# Patient Record
Sex: FEMALE | Race: ASIAN | NOT HISPANIC OR LATINO
[De-identification: names, ages, dates, MRNs, and addresses within clinical notes are randomized per-mention and may not be internally consistent; named-entity substitution may affect disease eponyms.]

---

## 2017-06-29 ENCOUNTER — APPOINTMENT (OUTPATIENT)
Dept: INTERNAL MEDICINE | Facility: CLINIC | Age: 47
End: 2017-06-29

## 2017-06-29 VITALS
SYSTOLIC BLOOD PRESSURE: 102 MMHG | BODY MASS INDEX: 20.43 KG/M2 | DIASTOLIC BLOOD PRESSURE: 74 MMHG | HEIGHT: 62 IN | WEIGHT: 111 LBS

## 2017-08-03 ENCOUNTER — OUTPATIENT (OUTPATIENT)
Dept: OUTPATIENT SERVICES | Facility: HOSPITAL | Age: 47
LOS: 1 days | End: 2017-08-03
Payer: COMMERCIAL

## 2017-08-03 ENCOUNTER — APPOINTMENT (OUTPATIENT)
Dept: RADIOLOGY | Facility: CLINIC | Age: 47
End: 2017-08-03
Payer: COMMERCIAL

## 2017-08-03 DIAGNOSIS — R91.1 SOLITARY PULMONARY NODULE: ICD-10-CM

## 2017-08-03 PROCEDURE — 71046 X-RAY EXAM CHEST 2 VIEWS: CPT

## 2017-08-03 PROCEDURE — 71020: CPT | Mod: 26

## 2017-09-05 ENCOUNTER — LABORATORY RESULT (OUTPATIENT)
Age: 47
End: 2017-09-05

## 2017-09-05 ENCOUNTER — APPOINTMENT (OUTPATIENT)
Dept: INTERNAL MEDICINE | Facility: CLINIC | Age: 47
End: 2017-09-05
Payer: COMMERCIAL

## 2017-09-05 PROCEDURE — 36415 COLL VENOUS BLD VENIPUNCTURE: CPT

## 2017-09-06 LAB
ALBUMIN SERPL ELPH-MCNC: 4.2 G/DL
ALP BLD-CCNC: 38 U/L
ALT SERPL-CCNC: 10 U/L
ANION GAP SERPL CALC-SCNC: 13 MMOL/L
AST SERPL-CCNC: 19 U/L
BASOPHILS # BLD AUTO: 0.02 K/UL
BASOPHILS NFR BLD AUTO: 0.4 %
BILIRUB SERPL-MCNC: 0.4 MG/DL
BUN SERPL-MCNC: 14 MG/DL
CALCIUM SERPL-MCNC: 8.8 MG/DL
CHLORIDE SERPL-SCNC: 106 MMOL/L
CHOLEST SERPL-MCNC: 176 MG/DL
CHOLEST/HDLC SERPL: 2.1 RATIO
CO2 SERPL-SCNC: 23 MMOL/L
CREAT SERPL-MCNC: 0.79 MG/DL
EOSINOPHIL # BLD AUTO: 0.1 K/UL
EOSINOPHIL NFR BLD AUTO: 1.8 %
GLUCOSE SERPL-MCNC: 86 MG/DL
HCT VFR BLD CALC: 37.7 %
HDLC SERPL-MCNC: 83 MG/DL
HGB BLD-MCNC: 12.2 G/DL
IMM GRANULOCYTES NFR BLD AUTO: 0.2 %
LDLC SERPL CALC-MCNC: 83 MG/DL
LDLC SERPL DIRECT ASSAY-MCNC: 91 MG/DL
LYMPHOCYTES # BLD AUTO: 2.33 K/UL
LYMPHOCYTES NFR BLD AUTO: 42.3 %
MAN DIFF?: NORMAL
MCHC RBC-ENTMCNC: 30.1 PG
MCHC RBC-ENTMCNC: 32.4 GM/DL
MCV RBC AUTO: 93.1 FL
MONOCYTES # BLD AUTO: 0.25 K/UL
MONOCYTES NFR BLD AUTO: 4.5 %
NEUTROPHILS # BLD AUTO: 2.8 K/UL
NEUTROPHILS NFR BLD AUTO: 50.8 %
PLATELET # BLD AUTO: 214 K/UL
POTASSIUM SERPL-SCNC: 4.3 MMOL/L
PROT SERPL-MCNC: 7.5 G/DL
RBC # BLD: 4.05 M/UL
RBC # FLD: 13.4 %
SAVE SPECIMEN: NORMAL
SODIUM SERPL-SCNC: 142 MMOL/L
T3 SERPL-MCNC: 92 NG/DL
T3RU NFR SERPL: 1 INDEX
T4 FREE SERPL-MCNC: 1.3 NG/DL
TRIGL SERPL-MCNC: 49 MG/DL
TSH SERPL-ACNC: 1.81 UIU/ML
WBC # FLD AUTO: 5.51 K/UL

## 2017-09-07 ENCOUNTER — APPOINTMENT (OUTPATIENT)
Dept: INTERNAL MEDICINE | Facility: CLINIC | Age: 47
End: 2017-09-07
Payer: COMMERCIAL

## 2017-09-07 VITALS
BODY MASS INDEX: 20.98 KG/M2 | HEIGHT: 62 IN | SYSTOLIC BLOOD PRESSURE: 98 MMHG | DIASTOLIC BLOOD PRESSURE: 72 MMHG | WEIGHT: 114 LBS

## 2017-09-07 PROCEDURE — 94010 BREATHING CAPACITY TEST: CPT

## 2017-09-07 PROCEDURE — 99396 PREV VISIT EST AGE 40-64: CPT | Mod: 25

## 2017-09-07 PROCEDURE — 93000 ELECTROCARDIOGRAM COMPLETE: CPT

## 2017-11-27 ENCOUNTER — APPOINTMENT (OUTPATIENT)
Dept: INTERNAL MEDICINE | Facility: CLINIC | Age: 47
End: 2017-11-27

## 2017-12-04 ENCOUNTER — RESULT REVIEW (OUTPATIENT)
Age: 47
End: 2017-12-04

## 2018-01-10 ENCOUNTER — APPOINTMENT (OUTPATIENT)
Dept: ULTRASOUND IMAGING | Facility: IMAGING CENTER | Age: 48
End: 2018-01-10
Payer: COMMERCIAL

## 2018-01-10 ENCOUNTER — OUTPATIENT (OUTPATIENT)
Dept: OUTPATIENT SERVICES | Facility: HOSPITAL | Age: 48
LOS: 1 days | End: 2018-01-10
Payer: COMMERCIAL

## 2018-01-10 ENCOUNTER — APPOINTMENT (OUTPATIENT)
Dept: MAMMOGRAPHY | Facility: IMAGING CENTER | Age: 48
End: 2018-01-10

## 2018-01-10 DIAGNOSIS — Z00.00 ENCOUNTER FOR GENERAL ADULT MEDICAL EXAMINATION WITHOUT ABNORMAL FINDINGS: ICD-10-CM

## 2018-01-10 PROCEDURE — 76641 ULTRASOUND BREAST COMPLETE: CPT

## 2018-01-10 PROCEDURE — 77063 BREAST TOMOSYNTHESIS BI: CPT

## 2018-01-10 PROCEDURE — 77067 SCR MAMMO BI INCL CAD: CPT

## 2018-01-10 PROCEDURE — 76641 ULTRASOUND BREAST COMPLETE: CPT | Mod: 26,50

## 2018-01-10 PROCEDURE — 77067 SCR MAMMO BI INCL CAD: CPT | Mod: 26

## 2018-01-10 PROCEDURE — 77063 BREAST TOMOSYNTHESIS BI: CPT | Mod: 26

## 2018-07-12 ENCOUNTER — OUTPATIENT (OUTPATIENT)
Dept: OUTPATIENT SERVICES | Facility: HOSPITAL | Age: 48
LOS: 1 days | End: 2018-07-12
Payer: COMMERCIAL

## 2018-07-12 ENCOUNTER — APPOINTMENT (OUTPATIENT)
Dept: ULTRASOUND IMAGING | Facility: IMAGING CENTER | Age: 48
End: 2018-07-12
Payer: COMMERCIAL

## 2018-07-12 DIAGNOSIS — Z00.8 ENCOUNTER FOR OTHER GENERAL EXAMINATION: ICD-10-CM

## 2018-07-12 PROCEDURE — 76642 ULTRASOUND BREAST LIMITED: CPT

## 2018-07-12 PROCEDURE — 76642 ULTRASOUND BREAST LIMITED: CPT | Mod: 26,50

## 2019-01-02 ENCOUNTER — LABORATORY RESULT (OUTPATIENT)
Age: 49
End: 2019-01-02

## 2019-01-02 ENCOUNTER — APPOINTMENT (OUTPATIENT)
Dept: INTERNAL MEDICINE | Facility: CLINIC | Age: 49
End: 2019-01-02
Payer: COMMERCIAL

## 2019-01-02 PROCEDURE — 36415 COLL VENOUS BLD VENIPUNCTURE: CPT

## 2019-01-03 LAB
ALBUMIN SERPL ELPH-MCNC: 4.7 G/DL
ALP BLD-CCNC: 41 U/L
ALT SERPL-CCNC: 17 U/L
ANION GAP SERPL CALC-SCNC: 10 MMOL/L
AST SERPL-CCNC: 21 U/L
BASOPHILS # BLD AUTO: 0.02 K/UL
BASOPHILS NFR BLD AUTO: 0.5 %
BILIRUB SERPL-MCNC: 1.1 MG/DL
BUN SERPL-MCNC: 11 MG/DL
CALCIUM SERPL-MCNC: 9.4 MG/DL
CHLORIDE SERPL-SCNC: 103 MMOL/L
CHOLEST SERPL-MCNC: 183 MG/DL
CHOLEST/HDLC SERPL: 2.1 RATIO
CO2 SERPL-SCNC: 27 MMOL/L
CREAT SERPL-MCNC: 0.78 MG/DL
EOSINOPHIL # BLD AUTO: 0.07 K/UL
EOSINOPHIL NFR BLD AUTO: 1.8 %
GLUCOSE SERPL-MCNC: 83 MG/DL
HCT VFR BLD CALC: 42.5 %
HDLC SERPL-MCNC: 87 MG/DL
HGB BLD-MCNC: 13.4 G/DL
IMM GRANULOCYTES NFR BLD AUTO: 0 %
LDLC SERPL CALC-MCNC: 82 MG/DL
LDLC SERPL DIRECT ASSAY-MCNC: 98 MG/DL
LYMPHOCYTES # BLD AUTO: 1.7 K/UL
LYMPHOCYTES NFR BLD AUTO: 43.6 %
MAN DIFF?: NORMAL
MCHC RBC-ENTMCNC: 30.4 PG
MCHC RBC-ENTMCNC: 31.5 GM/DL
MCV RBC AUTO: 96.4 FL
MONOCYTES # BLD AUTO: 0.18 K/UL
MONOCYTES NFR BLD AUTO: 4.6 %
NEUTROPHILS # BLD AUTO: 1.93 K/UL
NEUTROPHILS NFR BLD AUTO: 49.5 %
PLATELET # BLD AUTO: 208 K/UL
POTASSIUM SERPL-SCNC: 4.1 MMOL/L
PROT SERPL-MCNC: 7.5 G/DL
RBC # BLD: 4.41 M/UL
RBC # FLD: 13.2 %
SODIUM SERPL-SCNC: 140 MMOL/L
T3 SERPL-MCNC: 100 NG/DL
T3RU NFR SERPL: 1.12 INDEX
T4 FREE SERPL-MCNC: 1.1 NG/DL
TRIGL SERPL-MCNC: 71 MG/DL
TSH SERPL-ACNC: 1.82 UIU/ML
WBC # FLD AUTO: 3.9 K/UL

## 2019-01-07 LAB — SAVE SPECIMEN: NORMAL

## 2019-01-08 ENCOUNTER — APPOINTMENT (OUTPATIENT)
Dept: INTERNAL MEDICINE | Facility: CLINIC | Age: 49
End: 2019-01-08
Payer: COMMERCIAL

## 2019-01-08 VITALS
HEIGHT: 62 IN | SYSTOLIC BLOOD PRESSURE: 110 MMHG | BODY MASS INDEX: 21.16 KG/M2 | DIASTOLIC BLOOD PRESSURE: 70 MMHG | WEIGHT: 115 LBS

## 2019-01-08 DIAGNOSIS — R10.13 EPIGASTRIC PAIN: ICD-10-CM

## 2019-01-08 DIAGNOSIS — K59.00 CONSTIPATION, UNSPECIFIED: ICD-10-CM

## 2019-01-08 DIAGNOSIS — R53.83 OTHER FATIGUE: ICD-10-CM

## 2019-01-08 PROCEDURE — 93000 ELECTROCARDIOGRAM COMPLETE: CPT

## 2019-01-08 PROCEDURE — 99396 PREV VISIT EST AGE 40-64: CPT | Mod: 25

## 2019-01-08 PROCEDURE — 94010 BREATHING CAPACITY TEST: CPT

## 2019-01-08 NOTE — HISTORY OF PRESENT ILLNESS
[FreeTextEntry1] : CPE [de-identified] : 3 months of intermittent dyspepsia,  especially noticeable after eating spicy food. Occasional constipation. No melena. No rectal bleeding. No abdominal pain.\par Occasional fatigue

## 2019-01-08 NOTE — ASSESSMENT
[FreeTextEntry1] : Dyspepsia and FH stomach cancer-endoscopy\par Constipation and FH COLON cancer-diagnostic colonoscopy\par RX shingrix

## 2019-01-08 NOTE — PHYSICAL EXAM
[No Acute Distress] : no acute distress [Well Nourished] : well nourished [Well Developed] : well developed [Well-Appearing] : well-appearing [Normal Outer Ear/Nose] : the outer ears and nose were normal in appearance [Supple] : supple [No Respiratory Distress] : no respiratory distress  [Clear to Auscultation] : lungs were clear to auscultation bilaterally [Normal Rate] : normal rate  [Regular Rhythm] : with a regular rhythm [No Edema] : there was no peripheral edema [Normal Appearance] : normal in appearance [No Masses] : no palpable masses [No Nipple Discharge] : no nipple discharge [Soft] : abdomen soft [Non Tender] : non-tender [Normal Posterior Cervical Nodes] : no posterior cervical lymphadenopathy [Normal Anterior Cervical Nodes] : no anterior cervical lymphadenopathy [No CVA Tenderness] : no CVA  tenderness [No Spinal Tenderness] : no spinal tenderness [Normal Gait] : normal gait [Normal Affect] : the affect was normal [Normal Mood] : the mood was normal

## 2019-02-12 ENCOUNTER — APPOINTMENT (OUTPATIENT)
Dept: GASTROENTEROLOGY | Facility: CLINIC | Age: 49
End: 2019-02-12
Payer: COMMERCIAL

## 2019-02-12 ENCOUNTER — LABORATORY RESULT (OUTPATIENT)
Age: 49
End: 2019-02-12

## 2019-02-12 ENCOUNTER — RX RENEWAL (OUTPATIENT)
Age: 49
End: 2019-02-12

## 2019-02-12 LAB
HCG UR QL: NEGATIVE
QUALITY CONTROL: YES

## 2019-02-12 PROCEDURE — 45380 COLONOSCOPY AND BIOPSY: CPT | Mod: 59

## 2019-02-12 PROCEDURE — 81025 URINE PREGNANCY TEST: CPT

## 2019-02-12 PROCEDURE — 45385 COLONOSCOPY W/LESION REMOVAL: CPT

## 2019-02-12 PROCEDURE — 43239 EGD BIOPSY SINGLE/MULTIPLE: CPT

## 2019-04-04 ENCOUNTER — OUTPATIENT (OUTPATIENT)
Dept: OUTPATIENT SERVICES | Facility: HOSPITAL | Age: 49
LOS: 1 days | End: 2019-04-04
Payer: COMMERCIAL

## 2019-04-04 ENCOUNTER — APPOINTMENT (OUTPATIENT)
Dept: ULTRASOUND IMAGING | Facility: IMAGING CENTER | Age: 49
End: 2019-04-04
Payer: COMMERCIAL

## 2019-04-04 ENCOUNTER — APPOINTMENT (OUTPATIENT)
Dept: MAMMOGRAPHY | Facility: IMAGING CENTER | Age: 49
End: 2019-04-04
Payer: COMMERCIAL

## 2019-04-04 DIAGNOSIS — Z00.00 ENCOUNTER FOR GENERAL ADULT MEDICAL EXAMINATION WITHOUT ABNORMAL FINDINGS: ICD-10-CM

## 2019-04-04 DIAGNOSIS — R92.2 INCONCLUSIVE MAMMOGRAM: ICD-10-CM

## 2019-04-04 PROCEDURE — 77066 DX MAMMO INCL CAD BI: CPT | Mod: 26

## 2019-04-04 PROCEDURE — G0279: CPT

## 2019-04-04 PROCEDURE — 76641 ULTRASOUND BREAST COMPLETE: CPT | Mod: 26,50

## 2019-04-04 PROCEDURE — G0279: CPT | Mod: 26

## 2019-04-04 PROCEDURE — 77066 DX MAMMO INCL CAD BI: CPT

## 2019-04-04 PROCEDURE — 76641 ULTRASOUND BREAST COMPLETE: CPT

## 2019-06-12 ENCOUNTER — APPOINTMENT (OUTPATIENT)
Dept: INTERNAL MEDICINE | Facility: CLINIC | Age: 49
End: 2019-06-12

## 2020-06-12 ENCOUNTER — APPOINTMENT (OUTPATIENT)
Dept: INTERNAL MEDICINE | Facility: CLINIC | Age: 50
End: 2020-06-12
Payer: COMMERCIAL

## 2020-06-12 VITALS
DIASTOLIC BLOOD PRESSURE: 70 MMHG | SYSTOLIC BLOOD PRESSURE: 110 MMHG | HEART RATE: 73 BPM | OXYGEN SATURATION: 98 % | HEIGHT: 62 IN | WEIGHT: 115 LBS | TEMPERATURE: 99.3 F | BODY MASS INDEX: 21.16 KG/M2

## 2020-06-12 DIAGNOSIS — Z11.59 ENCOUNTER FOR SCREENING FOR OTHER VIRAL DISEASES: ICD-10-CM

## 2020-06-12 PROCEDURE — 99386 PREV VISIT NEW AGE 40-64: CPT | Mod: 25

## 2020-06-12 PROCEDURE — 93000 ELECTROCARDIOGRAM COMPLETE: CPT

## 2020-06-12 PROCEDURE — 36415 COLL VENOUS BLD VENIPUNCTURE: CPT

## 2020-06-12 RX ORDER — OMEPRAZOLE 40 MG/1
40 CAPSULE, DELAYED RELEASE ORAL
Qty: 30 | Refills: 2 | Status: DISCONTINUED | COMMUNITY
Start: 2019-02-12 | End: 2020-06-12

## 2020-06-12 NOTE — PLAN
[FreeTextEntry1] : \par HCM Follow with ophthalmology, dental, GYN as recommended, MMG Rx'd with breast U/S due to dense breast, check Hep B status, covid AB ( pt was possibly exposed while working at [employer] ). colonoscopy 2019 benign\par Will recommend Shingrix vaccination and TDAP\par EKG and lab done\par Easy bruising; pt to RTC when the bruise seen w/o trigger at unusual sites

## 2020-06-12 NOTE — HISTORY OF PRESENT ILLNESS
[FreeTextEntry1] : pt is here for CPE [de-identified] : pt has been doing well without any acute illness. pt is due to MMG and breast U/S. colonoscopy 2019 benign polyp

## 2020-06-12 NOTE — PHYSICAL EXAM
[No Acute Distress] : no acute distress [Well Developed] : well developed [Well-Appearing] : well-appearing [PERRL] : pupils equal round and reactive to light [Normal Sclera/Conjunctiva] : normal sclera/conjunctiva [EOMI] : extraocular movements intact [Normal Outer Ear/Nose] : the outer ears and nose were normal in appearance [Normal Oropharynx] : the oropharynx was normal [Normal TMs] : both tympanic membranes were normal [Thyroid Normal, No Nodules] : the thyroid was normal and there were no nodules present [Supple] : supple [No Accessory Muscle Use] : no accessory muscle use [Clear to Auscultation] : lungs were clear to auscultation bilaterally [No Respiratory Distress] : no respiratory distress  [Regular Rhythm] : with a regular rhythm [Normal Rate] : normal rate  [Normal S1, S2] : normal S1 and S2 [No Edema] : there was no peripheral edema [Soft] : abdomen soft [No Joint Swelling] : no joint swelling [Non-distended] : non-distended [Non Tender] : non-tender [Grossly Normal Strength/Tone] : grossly normal strength/tone [Coordination Grossly Intact] : coordination grossly intact [Alert and Oriented x3] : oriented to person, place, and time [Normal Gait] : normal gait [Normal Insight/Judgement] : insight and judgment were intact [de-identified] : fading bruise on lateral shin Rt

## 2020-06-12 NOTE — REVIEW OF SYSTEMS
[Easy Bruising] : easy bruising [Negative] : Neurological [Discharge] : no discharge [Redness] : no redness [Pain] : no pain [Vision Problems] : no vision problems [de-identified] : frequent bruise, one bruise on lateral shin

## 2020-06-16 LAB
25(OH)D3 SERPL-MCNC: 38.5 NG/ML
ALBUMIN SERPL ELPH-MCNC: 4.8 G/DL
ALP BLD-CCNC: 56 U/L
ALT SERPL-CCNC: 18 U/L
ANION GAP SERPL CALC-SCNC: 12 MMOL/L
AST SERPL-CCNC: 24 U/L
BASOPHILS # BLD AUTO: 0.03 K/UL
BASOPHILS NFR BLD AUTO: 0.7 %
BILIRUB SERPL-MCNC: 0.5 MG/DL
BUN SERPL-MCNC: 15 MG/DL
CALCIUM SERPL-MCNC: 9.4 MG/DL
CHLORIDE SERPL-SCNC: 105 MMOL/L
CHOLEST SERPL-MCNC: 187 MG/DL
CHOLEST/HDLC SERPL: 2.3 RATIO
CO2 SERPL-SCNC: 26 MMOL/L
CREAT SERPL-MCNC: 0.77 MG/DL
EOSINOPHIL # BLD AUTO: 0.11 K/UL
EOSINOPHIL NFR BLD AUTO: 2.6 %
GLUCOSE SERPL-MCNC: 89 MG/DL
HBV SURFACE AB SER QL: REACTIVE
HBV SURFACE AG SER QL: NONREACTIVE
HCT VFR BLD CALC: 40.3 %
HDLC SERPL-MCNC: 83 MG/DL
HGB BLD-MCNC: 12.7 G/DL
IMM GRANULOCYTES NFR BLD AUTO: 0.2 %
LDLC SERPL CALC-MCNC: 93 MG/DL
LYMPHOCYTES # BLD AUTO: 1.78 K/UL
LYMPHOCYTES NFR BLD AUTO: 41.9 %
MAN DIFF?: NORMAL
MCHC RBC-ENTMCNC: 30.5 PG
MCHC RBC-ENTMCNC: 31.5 GM/DL
MCV RBC AUTO: 96.6 FL
MONOCYTES # BLD AUTO: 0.17 K/UL
MONOCYTES NFR BLD AUTO: 4 %
NEUTROPHILS # BLD AUTO: 2.15 K/UL
NEUTROPHILS NFR BLD AUTO: 50.6 %
PLATELET # BLD AUTO: 182 K/UL
POTASSIUM SERPL-SCNC: 4.2 MMOL/L
PROT SERPL-MCNC: 6.9 G/DL
RBC # BLD: 4.17 M/UL
RBC # FLD: 12.1 %
SARS-COV-2 IGG SERPL IA-ACNC: 0.1 INDEX
SARS-COV-2 IGG SERPL QL IA: NEGATIVE
SODIUM SERPL-SCNC: 143 MMOL/L
TRIGL SERPL-MCNC: 54 MG/DL
TSH SERPL-ACNC: 1.84 UIU/ML
WBC # FLD AUTO: 4.25 K/UL

## 2020-06-16 RX ORDER — ZOSTER VACCINE RECOMBINANT, ADJUVANTED 50 MCG/0.5
50 KIT INTRAMUSCULAR
Qty: 1 | Refills: 1 | Status: ACTIVE | COMMUNITY
Start: 2020-06-16 | End: 1900-01-01

## 2020-07-28 ENCOUNTER — APPOINTMENT (OUTPATIENT)
Dept: INTERNAL MEDICINE | Facility: CLINIC | Age: 50
End: 2020-07-28
Payer: COMMERCIAL

## 2020-07-28 VITALS — TEMPERATURE: 98.8 F

## 2020-07-28 VITALS
BODY MASS INDEX: 21.35 KG/M2 | HEIGHT: 62 IN | SYSTOLIC BLOOD PRESSURE: 100 MMHG | DIASTOLIC BLOOD PRESSURE: 60 MMHG | WEIGHT: 116 LBS

## 2020-07-28 PROCEDURE — 99213 OFFICE O/P EST LOW 20 MIN: CPT

## 2020-07-28 RX ORDER — MELOXICAM 7.5 MG/1
7.5 TABLET ORAL
Qty: 30 | Refills: 0 | Status: ACTIVE | COMMUNITY
Start: 2020-07-28 | End: 1900-01-01

## 2020-07-28 NOTE — PLAN
[FreeTextEntry1] : \par chronic pain, ROM mildly limited. Trial of mobic 7.5mg daily with food for 1 week, and as needed daily and  observe closely clinically and RTC if persistent or worsening S/S\par Consider orthopedic clinic evaluation if persistent or worsening

## 2020-07-28 NOTE — REVIEW OF SYSTEMS
[Joint Pain] : joint pain [Joint Stiffness] : joint stiffness [Joint Swelling] : no joint swelling [Negative] : Gastrointestinal [FreeTextEntry9] : Rt shoulder pain with some limited ROM chronic

## 2020-07-28 NOTE — PHYSICAL EXAM
[No Acute Distress] : no acute distress [Well-Appearing] : well-appearing [Supple] : supple [No Respiratory Distress] : no respiratory distress  [No Joint Swelling] : no joint swelling [Grossly Normal Strength/Tone] : grossly normal strength/tone [Normal Gait] : normal gait [Normal Affect] : the affect was normal [Alert and Oriented x3] : oriented to person, place, and time [de-identified] : Rt shoulder no tenderness, ROM mildly limited on extension, abduction/adduction normal, flexion normal.

## 2020-07-28 NOTE — HISTORY OF PRESENT ILLNESS
[FreeTextEntry8] : Rt shoulder pain for years, about 10 years usually at night, and hard to wake up in the morning due to the pain. The pain goes away after  about 1 hour in the morning, and does not feel during daytime. Some limited ROM of right shoulder, has no significant trauma to the shoulder.

## 2020-09-01 ENCOUNTER — APPOINTMENT (OUTPATIENT)
Dept: ULTRASOUND IMAGING | Facility: IMAGING CENTER | Age: 50
End: 2020-09-01
Payer: COMMERCIAL

## 2020-09-01 ENCOUNTER — OUTPATIENT (OUTPATIENT)
Dept: OUTPATIENT SERVICES | Facility: HOSPITAL | Age: 50
LOS: 1 days | End: 2020-09-01
Payer: COMMERCIAL

## 2020-09-01 ENCOUNTER — RESULT REVIEW (OUTPATIENT)
Age: 50
End: 2020-09-01

## 2020-09-01 ENCOUNTER — APPOINTMENT (OUTPATIENT)
Dept: MAMMOGRAPHY | Facility: IMAGING CENTER | Age: 50
End: 2020-09-01
Payer: COMMERCIAL

## 2020-09-01 DIAGNOSIS — Z00.8 ENCOUNTER FOR OTHER GENERAL EXAMINATION: ICD-10-CM

## 2020-09-01 PROCEDURE — 77066 DX MAMMO INCL CAD BI: CPT | Mod: 26

## 2020-09-01 PROCEDURE — G0279: CPT | Mod: 26

## 2020-09-01 PROCEDURE — 76641 ULTRASOUND BREAST COMPLETE: CPT | Mod: 26,50

## 2020-09-01 PROCEDURE — G0279: CPT

## 2020-09-01 PROCEDURE — 77066 DX MAMMO INCL CAD BI: CPT

## 2020-09-01 PROCEDURE — 76641 ULTRASOUND BREAST COMPLETE: CPT

## 2020-11-13 ENCOUNTER — NON-APPOINTMENT (OUTPATIENT)
Age: 50
End: 2020-11-13

## 2021-09-07 ENCOUNTER — APPOINTMENT (OUTPATIENT)
Dept: INTERNAL MEDICINE | Facility: CLINIC | Age: 51
End: 2021-09-07
Payer: COMMERCIAL

## 2021-09-07 VITALS
SYSTOLIC BLOOD PRESSURE: 110 MMHG | DIASTOLIC BLOOD PRESSURE: 80 MMHG | HEIGHT: 62 IN | WEIGHT: 116 LBS | OXYGEN SATURATION: 99 % | HEART RATE: 81 BPM | BODY MASS INDEX: 21.35 KG/M2 | TEMPERATURE: 97.1 F

## 2021-09-07 DIAGNOSIS — R41.3 OTHER AMNESIA: ICD-10-CM

## 2021-09-07 DIAGNOSIS — Z23 ENCOUNTER FOR IMMUNIZATION: ICD-10-CM

## 2021-09-07 PROCEDURE — 99396 PREV VISIT EST AGE 40-64: CPT | Mod: 25

## 2021-09-07 PROCEDURE — 36415 COLL VENOUS BLD VENIPUNCTURE: CPT

## 2021-09-07 PROCEDURE — 90471 IMMUNIZATION ADMIN: CPT

## 2021-09-07 PROCEDURE — 90715 TDAP VACCINE 7 YRS/> IM: CPT

## 2021-09-07 PROCEDURE — 93000 ELECTROCARDIOGRAM COMPLETE: CPT

## 2021-09-07 NOTE — PHYSICAL EXAM
[No Acute Distress] : no acute distress [Well Nourished] : well nourished [Well Developed] : well developed [Well-Appearing] : well-appearing [Normal Sclera/Conjunctiva] : normal sclera/conjunctiva [PERRL] : pupils equal round and reactive to light [EOMI] : extraocular movements intact [Normal Outer Ear/Nose] : the outer ears and nose were normal in appearance [No JVD] : no jugular venous distention [No Lymphadenopathy] : no lymphadenopathy [Supple] : supple [Thyroid Normal, No Nodules] : the thyroid was normal and there were no nodules present [No Respiratory Distress] : no respiratory distress  [No Accessory Muscle Use] : no accessory muscle use [Clear to Auscultation] : lungs were clear to auscultation bilaterally [Regular Rhythm] : with a regular rhythm [Normal Rate] : normal rate  [Normal S1, S2] : normal S1 and S2 [No Murmur] : no murmur heard [No Edema] : there was no peripheral edema [No Extremity Clubbing/Cyanosis] : no extremity clubbing/cyanosis [Soft] : abdomen soft [Non Tender] : non-tender [Non-distended] : non-distended [No Joint Swelling] : no joint swelling [Grossly Normal Strength/Tone] : grossly normal strength/tone [Coordination Grossly Intact] : coordination grossly intact [No Focal Deficits] : no focal deficits [Normal Gait] : normal gait [Normal Affect] : the affect was normal [Normal Insight/Judgement] : insight and judgment were intact [Normal TMs] : both tympanic membranes were normal [Normal Appearance] : normal in appearance [No Masses] : no palpable masses [Normal Anterior Cervical Nodes] : no anterior cervical lymphadenopathy [Alert and Oriented x3] : oriented to person, place, and time [de-identified] : both shoulder joints no tenderness near full ROM

## 2021-09-07 NOTE — HEALTH RISK ASSESSMENT
[Good] : ~his/her~  mood as  good [No] : No [No falls in past year] : Patient reported no falls in the past year [0] : 2) Feeling down, depressed, or hopeless: Not at all (0) [PHQ-2 Negative - No further assessment needed] : PHQ-2 Negative - No further assessment needed [Patient reported PAP Smear was normal] : Patient reported PAP Smear was normal [HIV test declined] : HIV test declined [None] : None [With Significant Other] : lives with significant other [Employed] : employed [] :  [# Of Children ___] : has [unfilled] children [Feels Safe at Home] : Feels safe at home [Fully functional (bathing, dressing, toileting, transferring, walking, feeding)] : Fully functional (bathing, dressing, toileting, transferring, walking, feeding) [Fully functional (using the telephone, shopping, preparing meals, housekeeping, doing laundry, using] : Fully functional and needs no help or supervision to perform IADLs (using the telephone, shopping, preparing meals, housekeeping, doing laundry, using transportation, managing medications and managing finances) [Reports normal functional visual acuity (ie: able to read med bottle)] : Reports normal functional visual acuity [Smoke Detector] : smoke detector [Carbon Monoxide Detector] : carbon monoxide detector [Seat Belt] :  uses seat belt [Sunscreen] : uses sunscreen [Patient reported mammogram was normal] : Patient reported mammogram was normal [Hepatitis C test declined] : Hepatitis C test declined [] : No [Change in mental status noted] : No change in mental status noted [Language] : denies difficulty with language [Behavior] : denies difficulty with behavior [Reports changes in hearing] : Reports no changes in hearing [Reports changes in vision] : Reports no changes in vision [Reports changes in dental health] : Reports no changes in dental health [MammogramDate] : 2020 [PapSmearDate] : 2-3 month [PapSmearComments] : GYN 2-3 month ago, EGD 2019 focal IM [ColonoscopyDate] : 2019 [FreeTextEntry2] : works as  [de-identified] : glaucoma suspect minerva Goldsmith

## 2021-09-07 NOTE — REVIEW OF SYSTEMS
[Joint Pain] : joint pain [Joint Stiffness] : joint stiffness [Memory Loss] : memory loss [Negative] : Psychiatric [Joint Swelling] : no joint swelling [Muscle Weakness] : no muscle weakness [Muscle Pain] : no muscle pain [Back Pain] : no back pain [Headache] : no headache [Dizziness] : no dizziness [Fainting] : no fainting [Confusion] : no confusion [Unsteady Walking] : no ataxia [FreeTextEntry9] : chronic both shoulder pain and stiffness in the morning, not during daytime [de-identified] : short term memory loss lately for a few months

## 2021-09-07 NOTE — HISTORY OF PRESENT ILLNESS
[FreeTextEntry1] : CPE [de-identified] : pt has been doing well without any acute illness or injury. pt still gets right shoulder pain, did not see orthopedic clinic or PT but doing exercise herself at home for stretching which has been helping a lot. pt also has had short term memory loss, afraid that she is getting dementia. Mother had dementia but not early onset\par

## 2021-09-07 NOTE — PLAN
[FreeTextEntry1] : \par HCM )  follow w/ dental, opthalmology, GYN  as recommended\par Vaccination TDAP today A/B/R explained to pt in detail\par Shingrix Rx'd\par pt to receive shingrix and flu shot at pharmacy\par MMG 9/2020 benign, pt to go for it this year Rx'd by GYN\par \par Chronic right shoulder pain, cont' stretch exercise, PT evaluation, tylenol 500mg daily as needed and\par  observe closely clinically and RTC if persistent or worsening S/S\par \par EKG done for HCM at this office today;  NSR no acute ST-T changes\par Lab ( venipuncture ) done today at this office\par \par memory loss R/O early dementia neurology referral given to pt\par \par RTC CPE /PRN\par \par

## 2021-09-10 LAB
25(OH)D3 SERPL-MCNC: 43.7 NG/ML
ALBUMIN SERPL ELPH-MCNC: 4.9 G/DL
ALP BLD-CCNC: 60 U/L
ALT SERPL-CCNC: 15 U/L
ANION GAP SERPL CALC-SCNC: 12 MMOL/L
AST SERPL-CCNC: 23 U/L
BASOPHILS # BLD AUTO: 0.05 K/UL
BASOPHILS NFR BLD AUTO: 0.7 %
BILIRUB SERPL-MCNC: 0.5 MG/DL
BUN SERPL-MCNC: 13 MG/DL
CALCIUM SERPL-MCNC: 9.6 MG/DL
CHLORIDE SERPL-SCNC: 102 MMOL/L
CHOLEST SERPL-MCNC: 248 MG/DL
CO2 SERPL-SCNC: 24 MMOL/L
CREAT SERPL-MCNC: 0.76 MG/DL
EOSINOPHIL # BLD AUTO: 0.13 K/UL
EOSINOPHIL NFR BLD AUTO: 1.7 %
FOLATE SERPL-MCNC: 13.8 NG/ML
GLUCOSE SERPL-MCNC: 84 MG/DL
HCT VFR BLD CALC: 41.9 %
HDLC SERPL-MCNC: 95 MG/DL
HGB BLD-MCNC: 13.8 G/DL
IMM GRANULOCYTES NFR BLD AUTO: 0.1 %
LDLC SERPL CALC-MCNC: 142 MG/DL
LYMPHOCYTES # BLD AUTO: 2.11 K/UL
LYMPHOCYTES NFR BLD AUTO: 28.1 %
MAN DIFF?: NORMAL
MCHC RBC-ENTMCNC: 30.3 PG
MCHC RBC-ENTMCNC: 32.9 GM/DL
MCV RBC AUTO: 91.9 FL
MONOCYTES # BLD AUTO: 0.27 K/UL
MONOCYTES NFR BLD AUTO: 3.6 %
NEUTROPHILS # BLD AUTO: 4.93 K/UL
NEUTROPHILS NFR BLD AUTO: 65.8 %
NONHDLC SERPL-MCNC: 153 MG/DL
PLATELET # BLD AUTO: 206 K/UL
POTASSIUM SERPL-SCNC: 4.2 MMOL/L
PROT SERPL-MCNC: 7.6 G/DL
RBC # BLD: 4.56 M/UL
RBC # FLD: 12.2 %
SODIUM SERPL-SCNC: 138 MMOL/L
TRIGL SERPL-MCNC: 55 MG/DL
TSH SERPL-ACNC: 2.17 UIU/ML
VIT B12 SERPL-MCNC: 743 PG/ML
WBC # FLD AUTO: 7.5 K/UL

## 2021-09-27 ENCOUNTER — APPOINTMENT (OUTPATIENT)
Dept: MAMMOGRAPHY | Facility: IMAGING CENTER | Age: 51
End: 2021-09-27
Payer: COMMERCIAL

## 2021-09-27 ENCOUNTER — APPOINTMENT (OUTPATIENT)
Dept: ULTRASOUND IMAGING | Facility: IMAGING CENTER | Age: 51
End: 2021-09-27
Payer: COMMERCIAL

## 2021-09-27 ENCOUNTER — OUTPATIENT (OUTPATIENT)
Dept: OUTPATIENT SERVICES | Facility: HOSPITAL | Age: 51
LOS: 1 days | End: 2021-09-27
Payer: COMMERCIAL

## 2021-09-27 DIAGNOSIS — Z12.31 ENCOUNTER FOR SCREENING MAMMOGRAM FOR MALIGNANT NEOPLASM OF BREAST: ICD-10-CM

## 2021-09-27 PROCEDURE — 76641 ULTRASOUND BREAST COMPLETE: CPT | Mod: 26,50

## 2021-09-27 PROCEDURE — 77063 BREAST TOMOSYNTHESIS BI: CPT

## 2021-09-27 PROCEDURE — 77063 BREAST TOMOSYNTHESIS BI: CPT | Mod: 26

## 2021-09-27 PROCEDURE — 77067 SCR MAMMO BI INCL CAD: CPT | Mod: 26

## 2021-09-27 PROCEDURE — 76641 ULTRASOUND BREAST COMPLETE: CPT

## 2021-09-27 PROCEDURE — 77067 SCR MAMMO BI INCL CAD: CPT

## 2022-06-17 ENCOUNTER — APPOINTMENT (OUTPATIENT)
Dept: INTERNAL MEDICINE | Facility: CLINIC | Age: 52
End: 2022-06-17

## 2022-06-17 VITALS
BODY MASS INDEX: 20.8 KG/M2 | HEART RATE: 72 BPM | DIASTOLIC BLOOD PRESSURE: 68 MMHG | WEIGHT: 113 LBS | HEIGHT: 62 IN | TEMPERATURE: 97.8 F | OXYGEN SATURATION: 98 % | SYSTOLIC BLOOD PRESSURE: 102 MMHG

## 2022-06-17 VITALS — SYSTOLIC BLOOD PRESSURE: 102 MMHG | DIASTOLIC BLOOD PRESSURE: 68 MMHG

## 2022-06-17 VITALS — SYSTOLIC BLOOD PRESSURE: 110 MMHG | DIASTOLIC BLOOD PRESSURE: 70 MMHG

## 2022-06-17 DIAGNOSIS — R42 DIZZINESS AND GIDDINESS: ICD-10-CM

## 2022-06-17 DIAGNOSIS — M79.10 MYALGIA, UNSPECIFIED SITE: ICD-10-CM

## 2022-06-17 PROCEDURE — 99214 OFFICE O/P EST MOD 30 MIN: CPT | Mod: 25

## 2022-06-17 PROCEDURE — 36415 COLL VENOUS BLD VENIPUNCTURE: CPT

## 2022-06-17 RX ORDER — CYCLOBENZAPRINE HYDROCHLORIDE 5 MG/1
5 TABLET, FILM COATED ORAL
Qty: 30 | Refills: 2 | Status: ACTIVE | COMMUNITY
Start: 2022-06-17 | End: 1900-01-01

## 2022-06-17 NOTE — PLAN
[FreeTextEntry1] : \par Shoulder mass enlarging benign texture\par surgery referral for possible excision\par referral given to pt\par \par Myalgia with cramps, stiffness of muscle in the morning, trial of flexeril 5mg at bedtime, increase hydration, may take zinc supplement and observe\par further W/U if persistent or worsening\par \par Orthostatic dizziness, observe clinically, check lab, hydration\par Lab ( venipuncture ) done today at this office\par \par 3 month CPE

## 2022-06-17 NOTE — REVIEW OF SYSTEMS
[Muscle Weakness] : no muscle weakness [Muscle Pain] : muscle pain [Headache] : no headache [Dizziness] : dizziness [Fainting] : no fainting [Confusion] : no confusion [Negative] : Genitourinary [FreeTextEntry5] : No CP, palpitations or SOB [FreeTextEntry9] : muscle stiffness in the mornings [de-identified] : dizzy feeling when getting up from sitting, no other times

## 2022-06-17 NOTE — PHYSICAL EXAM
[No Acute Distress] : no acute distress [Well Developed] : well developed [Well-Appearing] : well-appearing [Supple] : supple [No Respiratory Distress] : no respiratory distress  [No Accessory Muscle Use] : no accessory muscle use [Clear to Auscultation] : lungs were clear to auscultation bilaterally [Normal Rate] : normal rate  [Regular Rhythm] : with a regular rhythm [Normal S1, S2] : normal S1 and S2 [No Edema] : there was no peripheral edema [No Extremity Clubbing/Cyanosis] : no extremity clubbing/cyanosis [Soft] : abdomen soft [Non-distended] : non-distended [No Joint Swelling] : no joint swelling [Grossly Normal Strength/Tone] : grossly normal strength/tone [Normal Gait] : normal gait [Normal Affect] : the affect was normal [Alert and Oriented x3] : oriented to person, place, and time [Normal Insight/Judgement] : insight and judgment were intact [de-identified] : 1.5 inch sized mobile mass soft on Rt shoulder area

## 2022-06-17 NOTE — HISTORY OF PRESENT ILLNESS
[FreeTextEntry8] : pt has been having dizziness getting up from sitting position for about 1-2 weeks which makes her sit down again. Pt denies any stomach pain, N/V/D. pt eats well. pt also feels cramps on her legs in the mornings, and feels very stiff on arms and legs, requiring a lot of stretching. pt also feel her cyst on her left shoulder seems to be bigger now, but no pain. pt retired and not doing work, not doing too much exercise. pt plays golf with friends without any problems at all, has no dizziness or SOB or CP  during golf

## 2022-06-21 LAB
ALBUMIN SERPL ELPH-MCNC: 4.9 G/DL
ALP BLD-CCNC: 59 U/L
ALT SERPL-CCNC: 21 U/L
ANION GAP SERPL CALC-SCNC: 10 MMOL/L
AST SERPL-CCNC: 33 U/L
BASOPHILS # BLD AUTO: 0.04 K/UL
BASOPHILS NFR BLD AUTO: 0.6 %
BILIRUB SERPL-MCNC: 0.4 MG/DL
BUN SERPL-MCNC: 21 MG/DL
CALCIUM SERPL-MCNC: 9.6 MG/DL
CHLORIDE SERPL-SCNC: 103 MMOL/L
CO2 SERPL-SCNC: 26 MMOL/L
CREAT SERPL-MCNC: 0.78 MG/DL
EGFR: 91 ML/MIN/1.73M2
EOSINOPHIL # BLD AUTO: 0.1 K/UL
EOSINOPHIL NFR BLD AUTO: 1.6 %
GLUCOSE SERPL-MCNC: 95 MG/DL
HCT VFR BLD CALC: 40.1 %
HGB BLD-MCNC: 13.1 G/DL
IMM GRANULOCYTES NFR BLD AUTO: 0.2 %
LYMPHOCYTES # BLD AUTO: 2.03 K/UL
LYMPHOCYTES NFR BLD AUTO: 32.8 %
MAN DIFF?: NORMAL
MCHC RBC-ENTMCNC: 29.9 PG
MCHC RBC-ENTMCNC: 32.7 GM/DL
MCV RBC AUTO: 91.6 FL
MONOCYTES # BLD AUTO: 0.27 K/UL
MONOCYTES NFR BLD AUTO: 4.4 %
NEUTROPHILS # BLD AUTO: 3.74 K/UL
NEUTROPHILS NFR BLD AUTO: 60.4 %
PLATELET # BLD AUTO: 191 K/UL
POTASSIUM SERPL-SCNC: 4.1 MMOL/L
PROT SERPL-MCNC: 7.5 G/DL
RBC # BLD: 4.38 M/UL
RBC # FLD: 11.7 %
SODIUM SERPL-SCNC: 139 MMOL/L
WBC # FLD AUTO: 6.19 K/UL

## 2022-08-09 ENCOUNTER — APPOINTMENT (OUTPATIENT)
Dept: SURGICAL ONCOLOGY | Facility: CLINIC | Age: 52
End: 2022-08-09

## 2022-08-09 VITALS
DIASTOLIC BLOOD PRESSURE: 67 MMHG | RESPIRATION RATE: 16 BRPM | TEMPERATURE: 97.8 F | SYSTOLIC BLOOD PRESSURE: 107 MMHG | WEIGHT: 115 LBS | HEART RATE: 78 BPM | OXYGEN SATURATION: 98 %

## 2022-08-09 PROCEDURE — 99203 OFFICE O/P NEW LOW 30 MIN: CPT

## 2022-08-09 NOTE — REASON FOR VISIT
[Initial Consultation] : an initial consultation for [Referred By: ___] : Referred By: [unfilled] [FreeTextEntry2] : left posterior shoulder mass

## 2022-08-09 NOTE — ASSESSMENT
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

## 2022-08-09 NOTE — HISTORY OF PRESENT ILLNESS
[de-identified] : Ms. Polk is a 51 y/o female who has had a soft mass over the posterior left shoulder for many years.\par She denies associated pain or overlying skin thickening.\par More recently, she feels that the mass has slightly enlarged.\par She has no associated muscle weakness or paresthesias.

## 2022-11-01 ENCOUNTER — APPOINTMENT (OUTPATIENT)
Dept: INTERNAL MEDICINE | Facility: CLINIC | Age: 52
End: 2022-11-01

## 2022-11-01 VITALS
WEIGHT: 112 LBS | BODY MASS INDEX: 20.61 KG/M2 | OXYGEN SATURATION: 98 % | HEART RATE: 75 BPM | DIASTOLIC BLOOD PRESSURE: 74 MMHG | HEIGHT: 62 IN | SYSTOLIC BLOOD PRESSURE: 120 MMHG

## 2022-11-01 DIAGNOSIS — M25.511 PAIN IN RIGHT SHOULDER: ICD-10-CM

## 2022-11-01 DIAGNOSIS — G89.29 PAIN IN RIGHT SHOULDER: ICD-10-CM

## 2022-11-01 PROCEDURE — 99213 OFFICE O/P EST LOW 20 MIN: CPT

## 2022-11-01 NOTE — PHYSICAL EXAM
[No Acute Distress] : no acute distress [Well-Appearing] : well-appearing [No Respiratory Distress] : no respiratory distress  [No Joint Swelling] : no joint swelling [Grossly Normal Strength/Tone] : grossly normal strength/tone [Normal Gait] : normal gait [Normal Affect] : the affect was normal [Alert and Oriented x3] : oriented to person, place, and time [de-identified] : Near FROM of both shoulder joints, no swelling, both arms normal muscle strength and tone

## 2022-11-01 NOTE — PLAN
[FreeTextEntry1] : \par PT evaluation for ROM and muscle relaxation exercises Rx given to pt\par cont' muscle relaxant use at bedtime as needed\par RTC CPE

## 2022-11-01 NOTE — HISTORY OF PRESENT ILLNESS
[FreeTextEntry8] : pt is here for Rt shoulder pain and stiffness for 2 years better and worse. pt started flexeril 5mg at bedtime which helps somewhat, but needs PT Rx \par pt feels more stiffness on Rt neck to shoulder region but able to move and use well\par No significant muscle weakness or sensory changes of arms, no neck or back pain

## 2022-11-04 ENCOUNTER — OUTPATIENT (OUTPATIENT)
Dept: OUTPATIENT SERVICES | Facility: HOSPITAL | Age: 52
LOS: 1 days | End: 2022-11-04

## 2022-11-04 VITALS
HEIGHT: 62 IN | TEMPERATURE: 98 F | HEART RATE: 69 BPM | WEIGHT: 113.1 LBS | DIASTOLIC BLOOD PRESSURE: 69 MMHG | RESPIRATION RATE: 16 BRPM | OXYGEN SATURATION: 98 % | SYSTOLIC BLOOD PRESSURE: 107 MMHG

## 2022-11-04 DIAGNOSIS — R22.30 LOCALIZED SWELLING, MASS AND LUMP, UNSPECIFIED UPPER LIMB: ICD-10-CM

## 2022-11-04 LAB
ANION GAP SERPL CALC-SCNC: 11 MMOL/L — SIGNIFICANT CHANGE UP (ref 7–14)
BUN SERPL-MCNC: 15 MG/DL — SIGNIFICANT CHANGE UP (ref 7–23)
CALCIUM SERPL-MCNC: 9.2 MG/DL — SIGNIFICANT CHANGE UP (ref 8.4–10.5)
CHLORIDE SERPL-SCNC: 101 MMOL/L — SIGNIFICANT CHANGE UP (ref 98–107)
CO2 SERPL-SCNC: 27 MMOL/L — SIGNIFICANT CHANGE UP (ref 22–31)
CREAT SERPL-MCNC: 0.76 MG/DL — SIGNIFICANT CHANGE UP (ref 0.5–1.3)
EGFR: 93 ML/MIN/1.73M2 — SIGNIFICANT CHANGE UP
GLUCOSE SERPL-MCNC: 93 MG/DL — SIGNIFICANT CHANGE UP (ref 70–99)
HCG UR QL: NEGATIVE — SIGNIFICANT CHANGE UP
HCT VFR BLD CALC: 39 % — SIGNIFICANT CHANGE UP (ref 34.5–45)
HGB BLD-MCNC: 12.8 G/DL — SIGNIFICANT CHANGE UP (ref 11.5–15.5)
MCHC RBC-ENTMCNC: 30.5 PG — SIGNIFICANT CHANGE UP (ref 27–34)
MCHC RBC-ENTMCNC: 32.8 GM/DL — SIGNIFICANT CHANGE UP (ref 32–36)
MCV RBC AUTO: 92.9 FL — SIGNIFICANT CHANGE UP (ref 80–100)
NRBC # BLD: 0 /100 WBCS — SIGNIFICANT CHANGE UP (ref 0–0)
NRBC # FLD: 0 K/UL — SIGNIFICANT CHANGE UP (ref 0–0)
PLATELET # BLD AUTO: 204 K/UL — SIGNIFICANT CHANGE UP (ref 150–400)
POTASSIUM SERPL-MCNC: 3.9 MMOL/L — SIGNIFICANT CHANGE UP (ref 3.5–5.3)
POTASSIUM SERPL-SCNC: 3.9 MMOL/L — SIGNIFICANT CHANGE UP (ref 3.5–5.3)
RBC # BLD: 4.2 M/UL — SIGNIFICANT CHANGE UP (ref 3.8–5.2)
RBC # FLD: 11.9 % — SIGNIFICANT CHANGE UP (ref 10.3–14.5)
SODIUM SERPL-SCNC: 139 MMOL/L — SIGNIFICANT CHANGE UP (ref 135–145)
WBC # BLD: 4.15 K/UL — SIGNIFICANT CHANGE UP (ref 3.8–10.5)
WBC # FLD AUTO: 4.15 K/UL — SIGNIFICANT CHANGE UP (ref 3.8–10.5)

## 2022-11-04 NOTE — H&P PST ADULT - MUSCULOSKELETAL
negative normal/ROM intact/normal gait/strength 5/5 bilateral upper extremities/strength 5/5 bilateral lower extremities/extremities exam

## 2022-11-04 NOTE — H&P PST ADULT - HISTORY OF PRESENT ILLNESS
33
Patient is is 52 rossy old female with pre op dx of localized swelling mass & lump left shoulder. Patient is scheduled for excision of left posterior shoulder mass.

## 2022-11-04 NOTE — H&P PST ADULT - PROBLEM SELECTOR PLAN 1
Patient tentatively scheduled for excision of left posterior shoulder mass on 11/10/22    Pre-op instructions provided. Pt given verbal and written instructions with teach back on chlorhexidine shampoo and Pepcid. Pt verbalized understanding with return demonstration.    Pt has a scheduled preop COVID test.

## 2022-11-04 NOTE — H&P PST ADULT - LAST CARDIAC ANGIOGRAM/IMAGING
.        Internal Medicine History & Physical                                                                  Patient Name: Radha Ferrera    YOB: 1942    MRN: [id number]         Date of Service: 7/27/2022    Subjective     Source: patient and EMR     Preferred Language: english    PCP: Jules Fortune MD    Chief Complaint:   Chief Complaint   Patient presents with   • Fall     HPI:  Ms Ferrera is a 81 yo F with PMHx of C-spine injury in 2008 who is presenting to the ED for seizure like activity.  Patient came to the ED via EMS.  Attempted to speak to patient about the seizure-like events, however patient does not recall what happened.  She has no complaints at this time.  History was obtained from her daughter who she lives with and witnessed the seizure.  Her daughter reported that she heard a loud bang earlier today and she went to the patient's room and found the patient on the floor shaking in a seizure-like manner.  She does not recall how long this lasted, but she called EMS and patient was brought to the hospital.  She reports that the patient did not come to full baseline mental status so she got to the hospital.  She does not recall tongue biting, urinary incontinence.  Upon further chart review, it appears that patient has had 2 EEGs 1 in 2016 and the other one in 2018, both nonrevealing for epileptiform activity.  Per daughter, patient also had 1 episode of seizure-like activity last year.  Patient is not on any antiepileptic medications at home.    In the ED, vitals were stable labs are unremarkable. CT head remarkable for tensive encephalomalacia from ischemic insult and contusions. It is admitted to observation for further monitoring.    Review of Systems:   Constitutional: Denies fever, chills, sweats   HEENT: Denies recent vision changes, double vision, nasal congestion, sore throat   Cardiovascular: Denies CP, palpitations, peripheral edema, syncope   Respiratory: Denies SOB,  wheezing, cough, hemoptysis   GI: Denies N/V/D/C, abdominal pain, hematemesis, melena, hematochezia   : Denies dysuria, hematuria   MSK: Denies muscle pain   Skin: Denies rash, pruritus   Heme/lymph: Denies LAD   Neuro: Denies numbness, tingling, weakness, headache     History reviewed. No pertinent past medical history.  Medications Prior to Admission   Medication Sig Dispense Refill   • Acetaminophen Extra Strength 500 MG tablet Take 500 mg by mouth every 6 hours as needed.       ALLERGIES:  No Known Allergies   Past Surgical History:   Procedure Laterality Date   • Spinal fusion       History reviewed. No pertinent family history.  Social History     Tobacco Use   • Smoking status: Never Smoker   • Smokeless tobacco: Never Used   Vaping Use   • Vaping Use: never used   Substance Use Topics   • Alcohol use: Not Currently   • Drug use: Never       Objective     Vitals:    07/28/22 0100   BP:    Pulse: 70   Resp: (!) 11   Temp:      Physical Exam  General: AOx4, NAD, appears stated age, resting in bed   HEENT: normocephalic, atraumatic, EOMI, PERRL, MMM   Neck: supple, nontender, no LAD   CV: RRR, +S1 +S2, no murmurs/rubs/gallops. Carotid, radial, and dorsalis pedis pulses +2/4 b/l. No JVD   Lungs: CTAB, no crackles/rhonchi/wheezing   Abdomen: soft, nontender, nondistended, BS present, tympanic to percussion. No rigidity, rebound or guarding.   Extremities: no peripheral edema, clubbing, or cyanosis   Neuro: CN 2-12 grossly intact   Skin: warm, dry, intact    No intake or output data in the 24 hours ending 07/28/22 0151     Recent Results (from the past 24 hour(s))   Electrocardiogram 12-Lead    Collection Time: 07/27/22  7:16 PM   Result Value Ref Range    Ventricular Rate EKG/Min (BPM) 79     Atrial Rate (BPM) 79     MN-Interval (MSEC) 138     QRS-Interval (MSEC) 72     QT-Interval (MSEC) 376     QTc 431     P Axis (Degrees) 50     R Axis (Degrees) 20     T Axis (Degrees) 39     REPORT TEXT       Normal sinus  rhythm  Nonspecific ST abnormality  Abnormal ECG  When compared with ECG of  11-NOV-2020 22:32,  No significant change was found     GLUCOSE, BEDSIDE - POINT OF CARE    Collection Time: 07/27/22  7:32 PM   Result Value Ref Range    GLUCOSE, BEDSIDE - POINT OF CARE 121 (H) 70 - 99 mg/dL   GLUCOSE, BEDSIDE - POINT OF CARE    Collection Time: 07/27/22  7:50 PM   Result Value Ref Range    GLUCOSE, BEDSIDE - POINT OF CARE 123 (H) 70 - 99 mg/dL   Comprehensive Metabolic Panel    Collection Time: 07/27/22  7:57 PM   Result Value Ref Range    Fasting Status      Sodium 136 135 - 145 mmol/L    Potassium 4.1 3.4 - 5.1 mmol/L    Chloride 103 97 - 110 mmol/L    Carbon Dioxide 26 21 - 32 mmol/L    Anion Gap 11 7 - 19 mmol/L    Glucose 121 (H) 70 - 99 mg/dL    BUN 12 6 - 20 mg/dL    Creatinine 0.80 0.51 - 0.95 mg/dL    Glomerular Filtration Rate 74 >=60    BUN/ Creatinine Ratio 15 7 - 25    Calcium 9.2 8.4 - 10.2 mg/dL    Bilirubin, Total 0.3 0.2 - 1.0 mg/dL    GOT/AST 34 <=37 Units/L    GPT/ALT 36 <64 Units/L    Alkaline Phosphatase 97 45 - 117 Units/L    Albumin 4.0 3.6 - 5.1 g/dL    Protein, Total 7.3 6.4 - 8.2 g/dL    Globulin 3.3 2.0 - 4.0 g/dL    A/G Ratio 1.2 1.0 - 2.4   CBC with Automated Differential (performable only)    Collection Time: 07/27/22  7:57 PM   Result Value Ref Range    WBC 6.3 4.2 - 11.0 K/mcL    RBC 3.89 (L) 4.00 - 5.20 mil/mcL    HGB 12.2 12.0 - 15.5 g/dL    HCT 37.4 36.0 - 46.5 %    MCV 96.1 78.0 - 100.0 fl    MCH 31.4 26.0 - 34.0 pg    MCHC 32.6 32.0 - 36.5 g/dL    RDW-CV 13.1 11.0 - 15.0 %    RDW-SD 46.5 39.0 - 50.0 fL     140 - 450 K/mcL    NRBC 0 <=0 /100 WBC    Neutrophil, Percent 71 %    Lymphocytes, Percent 21 %    Mono, Percent 6 %    Eosinophils, Percent 2 %    Basophils, Percent 0 %    Immature Granulocytes 0 %    Absolute Neutrophils 4.5 1.8 - 7.7 K/mcL    Absolute Lymphocytes 1.3 1.0 - 4.0 K/mcL    Absolute Monocytes 0.4 0.3 - 0.9 K/mcL    Absolute Eosinophils  0.1 0.0 - 0.5 K/mcL     Absolute Basophils 0.0 0.0 - 0.3 K/mcL    Absolute Immmature Granulocytes 0.0 0.0 - 0.2 K/mcL   Lipase    Collection Time: 07/27/22  7:57 PM   Result Value Ref Range    Lipase 71 (L) 73 - 393 Units/L   Prothrombin Time    Collection Time: 07/27/22  7:57 PM   Result Value Ref Range    Prothrombin Time 10.7 9.7 - 11.8 sec    INR 1.0     Partial Thromboplastin Time    Collection Time: 07/27/22  7:57 PM   Result Value Ref Range    PTT 26 22 - 30 sec   TROPONIN I, HIGH SENSITIVITY    Collection Time: 07/27/22  7:57 PM   Result Value Ref Range    Troponin I, High Sensitivity 12 <52 ng/L   Magnesium    Collection Time: 07/27/22  7:57 PM   Result Value Ref Range    Magnesium 2.3 1.7 - 2.4 mg/dL   NT proBNP    Collection Time: 07/27/22  7:57 PM   Result Value Ref Range    NT-proBNP 277 <=450 pg/mL   Rapid SARS-CoV-2 by PCR    Collection Time: 07/27/22 11:09 PM    Specimen: Nasal, Mid-turbinate; Swab   Result Value Ref Range    Rapid SARS-COV-2 by PCR Not Detected Not Detected / Detected / Presumptive Positive / Inhibitors present    Isolation Guidelines      Procedural Comment         Current Facility-Administered Medications   Medication   • melatonin tablet 6 mg   • sodium chloride 0.9 % flush bag 25 mL   • sodium chloride (PF) 0.9 % injection 2 mL   • ibuprofen (MOTRIN) tablet 600 mg          Assessment and Plan   Ms Ferrera is a 81 yo F with pmhx of C-spine injury in 2008 who is presenting to the ED for seizure like activity.    #Seizure-like activity  -Pt brought to the hospital by daughter for a witnessed seizure, unknown down time  -Pt did not appear post-ictal in the ED, pt has no memory of this event   -Labs unremarkable in the ED   -TTE from 2017 with normal EF and grade I diastolic dysfunction  -Has been Evaluated for this in the past, 2018 and 2018 EEGs non specific for epileptiform activity   -ECG unremarkable   -CT head remarkable for extensive encephalomalacia from ischemic insult and contusions. Chronic  ischemic changes in the deep white matter. No acute hemorrhage mass effect or shift. Scalp contusion.  Plan  -EEG w/ video pending  -Urine tox pending   -Neurology consulted and following - recommended MRI brain and cervical spine to evaluate for possible cervical myopathy related etiology for seizure like episode  -Antiepileptic administration pending further workup   -Telemetry monitoring     #Hx of C-spine injury   Plan  -On Tylenol PRN for pain    FENA  Fluids: NA  Electrolytes: replace PRN  Nutrition: general diet  Activity: advance to baseline    PPx:  VTE: SCDs  GI: not indicated    Code Status: FULL CODE    Dispo: Observation    Pt discussed with Mariajose Abdalla, DO  Update by Philippe Mena MD   Denies

## 2022-11-04 NOTE — H&P PST ADULT - NSICDXFAMILYHX_GEN_ALL_CORE_FT
FAMILY HISTORY:  Sibling  Still living? Yes, Estimated age: Age Unknown  FHx: colon cancer, Age at diagnosis: Age Unknown

## 2022-11-08 ENCOUNTER — APPOINTMENT (OUTPATIENT)
Dept: INTERNAL MEDICINE | Facility: CLINIC | Age: 52
End: 2022-11-08

## 2022-11-08 VITALS
HEIGHT: 62 IN | WEIGHT: 112 LBS | DIASTOLIC BLOOD PRESSURE: 70 MMHG | OXYGEN SATURATION: 99 % | HEART RATE: 64 BPM | BODY MASS INDEX: 20.61 KG/M2 | SYSTOLIC BLOOD PRESSURE: 110 MMHG

## 2022-11-08 DIAGNOSIS — Z01.818 ENCOUNTER FOR OTHER PREPROCEDURAL EXAMINATION: ICD-10-CM

## 2022-11-08 DIAGNOSIS — R22.30 LOCALIZED SWELLING, MASS AND LUMP, UNSPECIFIED UPPER LIMB: ICD-10-CM

## 2022-11-08 LAB — SARS-COV-2 RNA SPEC QL NAA+PROBE: SIGNIFICANT CHANGE UP

## 2022-11-08 PROCEDURE — 93000 ELECTROCARDIOGRAM COMPLETE: CPT

## 2022-11-08 PROCEDURE — 99213 OFFICE O/P EST LOW 20 MIN: CPT | Mod: 25

## 2022-11-08 NOTE — HISTORY OF PRESENT ILLNESS
[FreeTextEntry1] : medical clearance [de-identified] : pt is going for left shoulder region mass removal surgery on 11/10 and here for medical clearance\par pt had PST lab done 2-3 days ago\par No complaints

## 2022-11-08 NOTE — PLAN
[FreeTextEntry1] : \par PST reviewed normal CBC BMP\par EKG done for pre-op, NSR no acute ST-T changes\par pt is medically clear for the proposed mass removal surgery

## 2022-11-09 ENCOUNTER — TRANSCRIPTION ENCOUNTER (OUTPATIENT)
Age: 52
End: 2022-11-09

## 2022-11-09 VITALS
OXYGEN SATURATION: 100 % | TEMPERATURE: 98 F | SYSTOLIC BLOOD PRESSURE: 115 MMHG | RESPIRATION RATE: 20 BRPM | WEIGHT: 113.1 LBS | DIASTOLIC BLOOD PRESSURE: 39 MMHG | HEART RATE: 62 BPM | HEIGHT: 62 IN

## 2022-11-09 NOTE — ASU PREOPERATIVE ASSESSMENT, ADULT ([facility] ONLY) - FALL HARM RISK - UNIVERSAL INTERVENTIONS
Bed in lowest position, wheels locked, appropriate side rails in place/Call bell, personal items and telephone in reach/Instruct patient to call for assistance before getting out of bed or chair/Non-slip footwear when patient is out of bed/[place] to call system/Physically safe environment - no spills, clutter or unnecessary equipment/Purposeful Proactive Rounding/Room/bathroom lighting operational, light cord in reach

## 2022-11-10 ENCOUNTER — TRANSCRIPTION ENCOUNTER (OUTPATIENT)
Age: 52
End: 2022-11-10

## 2022-11-10 ENCOUNTER — RESULT REVIEW (OUTPATIENT)
Age: 52
End: 2022-11-10

## 2022-11-10 ENCOUNTER — OUTPATIENT (OUTPATIENT)
Dept: OUTPATIENT SERVICES | Facility: HOSPITAL | Age: 52
LOS: 1 days | Discharge: HOME | End: 2022-11-10

## 2022-11-10 ENCOUNTER — APPOINTMENT (OUTPATIENT)
Dept: SURGICAL ONCOLOGY | Facility: AMBULATORY SURGERY CENTER | Age: 52
End: 2022-11-10

## 2022-11-10 VITALS
HEART RATE: 78 BPM | SYSTOLIC BLOOD PRESSURE: 104 MMHG | DIASTOLIC BLOOD PRESSURE: 57 MMHG | OXYGEN SATURATION: 98 % | RESPIRATION RATE: 15 BRPM

## 2022-11-10 DIAGNOSIS — R22.30 LOCALIZED SWELLING, MASS AND LUMP, UNSPECIFIED UPPER LIMB: ICD-10-CM

## 2022-11-10 PROCEDURE — 23076 EXC SHOULDER TUM DEEP < 5 CM: CPT

## 2022-11-10 NOTE — BRIEF OPERATIVE NOTE - OPERATION/FINDINGS
Dissected down to shoulder mass removed internal attachments and excised mass. Closed with steristrips and telfa with tegaderm overhead.

## 2022-11-10 NOTE — ASU DISCHARGE PLAN (ADULT/PEDIATRIC) - CARE PROVIDER_API CALL
Ramu Lu)  Surgery  [address] [place] [address]  Phone: [phone]  Fax: [phone]  Follow Up Time: 1 week

## 2022-11-15 LAB — SURGICAL PATHOLOGY STUDY: SIGNIFICANT CHANGE UP

## 2022-12-05 NOTE — ASU DISCHARGE PLAN (ADULT/PEDIATRIC) - MEDICATION INSTRUCTIONS
The patient is a 37y Female complaining of shortness of breath.
Tylenol and ibuprofen every 6 hours as needed for pain

## 2022-12-27 ENCOUNTER — APPOINTMENT (OUTPATIENT)
Dept: INTERNAL MEDICINE | Facility: CLINIC | Age: 52
End: 2022-12-27

## 2023-01-11 NOTE — H&P PST ADULT - HIV STATUS
Bactrim Pregnancy And Lactation Text: This medication is Pregnancy Category D and is known to cause fetal risk.  It is also excreted in breast milk. Include Pregnancy/Lactation Warning?: No n/a Negative/Unknown Bactrim Counseling:  I discussed with the patient the risks of sulfa antibiotics including but not limited to GI upset, allergic reaction, drug rash, diarrhea, dizziness, photosensitivity, and yeast infections.  Rarely, more serious reactions can occur including but not limited to aplastic anemia, agranulocytosis, methemoglobinemia, blood dyscrasias, liver or kidney failure, lung infiltrates or desquamative/blistering drug rashes. Aklief Pregnancy And Lactation Text: It is unknown if this medication is safe to use during pregnancy.  It is unknown if this medication is excreted in breast milk.  Breastfeeding women should use the topical cream on the smallest area of the skin for the shortest time needed while breastfeeding.  Do not apply to nipple and areola. Aklief counseling:  Patient advised to apply a pea-sized amount only at bedtime and wait 30 minutes after washing their face before applying.  If too drying, patient may add a non-comedogenic moisturizer.  The most commonly reported side effects including irritation, redness, scaling, dryness, stinging, burning, itching, and increased risk of sunburn.  The patient verbalized understanding of the proper use and possible adverse effects of retinoids.  All of the patient's questions and concerns were addressed. Dapsone Pregnancy And Lactation Text: This medication is Pregnancy Category C and is not considered safe during pregnancy or breast feeding. Detail Level: Detailed Erythromycin Counseling:  I discussed with the patient the risks of erythromycin including but not limited to GI upset, allergic reaction, drug rash, diarrhea, increase in liver enzymes, and yeast infections. High Dose Vitamin A Counseling: Side effects reviewed, pt to contact office should one occur. Isotretinoin Pregnancy And Lactation Text: This medication is Pregnancy Category X and is considered extremely dangerous during pregnancy. It is unknown if it is excreted in breast milk. Benzoyl Peroxide Counseling: Patient counseled that medicine may cause skin irritation and bleach clothing.  In the event of skin irritation, the patient was advised to reduce the amount of the drug applied or use it less frequently.   The patient verbalized understanding of the proper use and possible adverse effects of benzoyl peroxide.  All of the patient's questions and concerns were addressed. Benzoyl Peroxide Pregnancy And Lactation Text: This medication is Pregnancy Category C. It is unknown if benzoyl peroxide is excreted in breast milk. Doxycycline Pregnancy And Lactation Text: This medication is Pregnancy Category D and not consider safe during pregnancy. It is also excreted in breast milk but is considered safe for shorter treatment courses. Topical Sulfur Applications Counseling: Topical Sulfur Counseling: Patient counseled that this medication may cause skin irritation or allergic reactions.  In the event of skin irritation, the patient was advised to reduce the amount of the drug applied or use it less frequently.   The patient verbalized understanding of the proper use and possible adverse effects of topical sulfur application.  All of the patient's questions and concerns were addressed. Minocycline Counseling: Patient advised regarding possible photosensitivity and discoloration of the teeth, skin, lips, tongue and gums.  Patient instructed to avoid sunlight, if possible.  When exposed to sunlight, patients should wear protective clothing, sunglasses, and sunscreen.  The patient was instructed to call the office immediately if the following severe adverse effects occur:  hearing changes, easy bruising/bleeding, severe headache, or vision changes.  The patient verbalized understanding of the proper use and possible adverse effects of minocycline.  All of the patient's questions and concerns were addressed. Topical Clindamycin Pregnancy And Lactation Text: This medication is Pregnancy Category B and is considered safe during pregnancy. It is unknown if it is excreted in breast milk. Azithromycin Pregnancy And Lactation Text: This medication is considered safe during pregnancy and is also secreted in breast milk. Tetracycline Counseling: Patient counseled regarding possible photosensitivity and increased risk for sunburn.  Patient instructed to avoid sunlight, if possible.  When exposed to sunlight, patients should wear protective clothing, sunglasses, and sunscreen.  The patient was instructed to call the office immediately if the following severe adverse effects occur:  hearing changes, easy bruising/bleeding, severe headache, or vision changes.  The patient verbalized understanding of the proper use and possible adverse effects of tetracycline.  All of the patient's questions and concerns were addressed. Patient understands to avoid pregnancy while on therapy due to potential birth defects. Birth Control Pills Counseling: Birth Control Pill Counseling: I discussed with the patient the potential side effects of OCPs including but not limited to increased risk of stroke, heart attack, thrombophlebitis, deep venous thrombosis, hepatic adenomas, breast changes, GI upset, headaches, and depression.  The patient verbalized understanding of the proper use and possible adverse effects of OCPs. All of the patient's questions and concerns were addressed. Sarecycline Pregnancy And Lactation Text: This medication is Pregnancy Category D and not consider safe during pregnancy. It is also excreted in breast milk. Doxycycline Counseling:  Patient counseled regarding possible photosensitivity and increased risk for sunburn.  Patient instructed to avoid sunlight, if possible.  When exposed to sunlight, patients should wear protective clothing, sunglasses, and sunscreen.  The patient was instructed to call the office immediately if the following severe adverse effects occur:  hearing changes, easy bruising/bleeding, severe headache, or vision changes.  The patient verbalized understanding of the proper use and possible adverse effects of doxycycline.  All of the patient's questions and concerns were addressed. Dapsone Counseling: I discussed with the patient the risks of dapsone including but not limited to hemolytic anemia, agranulocytosis, rashes, methemoglobinemia, kidney failure, peripheral neuropathy, headaches, GI upset, and liver toxicity.  Patients who start dapsone require monitoring including baseline LFTs and weekly CBCs for the first month, then every month thereafter.  The patient verbalized understanding of the proper use and possible adverse effects of dapsone.  All of the patient's questions and concerns were addressed. Spironolactone Pregnancy And Lactation Text: This medication can cause feminization of the male fetus and should be avoided during pregnancy. The active metabolite is also found in breast milk. Sarecycline Counseling: Patient advised regarding possible photosensitivity and discoloration of the teeth, skin, lips, tongue and gums.  Patient instructed to avoid sunlight, if possible.  When exposed to sunlight, patients should wear protective clothing, sunglasses, and sunscreen.  The patient was instructed to call the office immediately if the following severe adverse effects occur:  hearing changes, easy bruising/bleeding, severe headache, or vision changes.  The patient verbalized understanding of the proper use and possible adverse effects of sarecycline.  All of the patient's questions and concerns were addressed. High Dose Vitamin A Pregnancy And Lactation Text: High dose vitamin A therapy is contraindicated during pregnancy and breast feeding. Birth Control Pills Pregnancy And Lactation Text: This medication should be avoided if pregnant and for the first 30 days post-partum. Erythromycin Pregnancy And Lactation Text: This medication is Pregnancy Category B and is considered safe during pregnancy. It is also excreted in breast milk. Isotretinoin Counseling: Patient should get monthly blood tests, not donate blood, not drive at night if vision affected, not share medication, and not undergo elective surgery for 6 months after tx completed. Side effects reviewed, pt to contact office should one occur. Topical Retinoid counseling:  Patient advised to apply a pea-sized amount only at bedtime and wait 30 minutes after washing their face before applying.  If too drying, patient may add a non-comedogenic moisturizer. The patient verbalized understanding of the proper use and possible adverse effects of retinoids.  All of the patient's questions and concerns were addressed. Azelaic Acid Pregnancy And Lactation Text: This medication is considered safe during pregnancy and breast feeding. Tazorac Pregnancy And Lactation Text: This medication is not safe during pregnancy. It is unknown if this medication is excreted in breast milk. Topical Sulfur Applications Pregnancy And Lactation Text: This medication is Pregnancy Category C and has an unknown safety profile during pregnancy. It is unknown if this topical medication is excreted in breast milk. Topical Retinoid Pregnancy And Lactation Text: This medication is Pregnancy Category C. It is unknown if this medication is excreted in breast milk. Azelaic Acid Counseling: Patient counseled that medicine may cause skin irritation and to avoid applying near the eyes.  In the event of skin irritation, the patient was advised to reduce the amount of the drug applied or use it less frequently.   The patient verbalized understanding of the proper use and possible adverse effects of azelaic acid.  All of the patient's questions and concerns were addressed. Topical Clindamycin Counseling: Patient counseled that this medication may cause skin irritation or allergic reactions.  In the event of skin irritation, the patient was advised to reduce the amount of the drug applied or use it less frequently.   The patient verbalized understanding of the proper use and possible adverse effects of clindamycin.  All of the patient's questions and concerns were addressed. Spironolactone Counseling: Patient advised regarding risks of diarrhea, abdominal pain, hyperkalemia, birth defects (for female patients), liver toxicity and renal toxicity. The patient may need blood work to monitor liver and kidney function and potassium levels while on therapy. The patient verbalized understanding of the proper use and possible adverse effects of spironolactone.  All of the patient's questions and concerns were addressed. Tazorac Counseling:  Patient advised that medication is irritating and drying.  Patient may need to apply sparingly and wash off after an hour before eventually leaving it on overnight.  The patient verbalized understanding of the proper use and possible adverse effects of tazorac.  All of the patient's questions and concerns were addressed. Winlevi Pregnancy And Lactation Text: This medication is considered safe during pregnancy and breastfeeding. Winlevi Counseling:  I discussed with the patient the risks of topical clascoterone including but not limited to erythema, scaling, itching, and stinging. Patient voiced their understanding. Azithromycin Counseling:  I discussed with the patient the risks of azithromycin including but not limited to GI upset, allergic reaction, drug rash, diarrhea, and yeast infections.

## 2023-01-18 ENCOUNTER — NON-APPOINTMENT (OUTPATIENT)
Age: 53
End: 2023-01-18

## 2023-06-21 PROBLEM — H40.9 UNSPECIFIED GLAUCOMA: Chronic | Status: ACTIVE | Noted: 2022-11-04

## 2023-06-21 PROBLEM — R22.30 LOCALIZED SWELLING, MASS AND LUMP, UNSPECIFIED UPPER LIMB: Chronic | Status: ACTIVE | Noted: 2022-11-04

## 2023-06-27 ENCOUNTER — TRANSCRIPTION ENCOUNTER (OUTPATIENT)
Age: 53
End: 2023-06-27

## 2023-06-27 ENCOUNTER — APPOINTMENT (OUTPATIENT)
Dept: INTERNAL MEDICINE | Facility: CLINIC | Age: 53
End: 2023-06-27
Payer: COMMERCIAL

## 2023-06-27 VITALS
BODY MASS INDEX: 20.98 KG/M2 | SYSTOLIC BLOOD PRESSURE: 112 MMHG | HEIGHT: 62 IN | HEART RATE: 80 BPM | WEIGHT: 114 LBS | OXYGEN SATURATION: 98 % | DIASTOLIC BLOOD PRESSURE: 80 MMHG

## 2023-06-27 DIAGNOSIS — E78.5 HYPERLIPIDEMIA, UNSPECIFIED: ICD-10-CM

## 2023-06-27 DIAGNOSIS — N63.0 UNSPECIFIED LUMP IN UNSPECIFIED BREAST: ICD-10-CM

## 2023-06-27 DIAGNOSIS — Z00.00 ENCOUNTER FOR GENERAL ADULT MEDICAL EXAMINATION W/OUT ABNORMAL FINDINGS: ICD-10-CM

## 2023-06-27 DIAGNOSIS — Z12.31 ENCOUNTER FOR SCREENING MAMMOGRAM FOR MALIGNANT NEOPLASM OF BREAST: ICD-10-CM

## 2023-06-27 DIAGNOSIS — E55.9 VITAMIN D DEFICIENCY, UNSPECIFIED: ICD-10-CM

## 2023-06-27 PROCEDURE — 36415 COLL VENOUS BLD VENIPUNCTURE: CPT

## 2023-06-27 PROCEDURE — 99396 PREV VISIT EST AGE 40-64: CPT | Mod: 25

## 2023-06-27 PROCEDURE — 93000 ELECTROCARDIOGRAM COMPLETE: CPT

## 2023-06-27 NOTE — PHYSICAL EXAM
[No Acute Distress] : no acute distress [Well Nourished] : well nourished [Well Developed] : well developed [Well-Appearing] : well-appearing [Normal Sclera/Conjunctiva] : normal sclera/conjunctiva [Normal Outer Ear/Nose] : the outer ears and nose were normal in appearance [Normal Oropharynx] : the oropharynx was normal [No JVD] : no jugular venous distention [No Lymphadenopathy] : no lymphadenopathy [Supple] : supple [Thyroid Normal, No Nodules] : the thyroid was normal and there were no nodules present [No Respiratory Distress] : no respiratory distress  [No Accessory Muscle Use] : no accessory muscle use [Clear to Auscultation] : lungs were clear to auscultation bilaterally [Normal Rate] : normal rate  [Regular Rhythm] : with a regular rhythm [Normal S1, S2] : normal S1 and S2 [No Murmur] : no murmur heard [No Edema] : there was no peripheral edema [No Extremity Clubbing/Cyanosis] : no extremity clubbing/cyanosis [Normal Appearance] : normal in appearance [Soft] : abdomen soft [Non Tender] : non-tender [Non-distended] : non-distended [No Masses] : no abdominal mass palpated [No HSM] : no HSM [Normal Anterior Cervical Nodes] : no anterior cervical lymphadenopathy [Grossly Normal Strength/Tone] : grossly normal strength/tone [Coordination Grossly Intact] : coordination grossly intact [Normal Gait] : normal gait [Normal Affect] : the affect was normal [Alert and Oriented x3] : oriented to person, place, and time [Normal Insight/Judgement] : insight and judgment were intact

## 2023-06-27 NOTE — HEALTH RISK ASSESSMENT
[No] : In the past 12 months have you used drugs other than those required for medical reasons? No [No falls in past year] : Patient reported no falls in the past year [0] : 2) Feeling down, depressed, or hopeless: Not at all (0) [PHQ-2 Negative - No further assessment needed] : PHQ-2 Negative - No further assessment needed [Patient reported mammogram was normal] : Patient reported mammogram was normal [None] : None [Never] : Never [Very Good] : ~his/her~  mood as very good [No Retinopathy] : No retinopathy [HIV test declined] : HIV test declined [Hepatitis C test declined] : Hepatitis C test declined [With Significant Other] : lives with significant other [] :  [# Of Children ___] : has [unfilled] children [Feels Safe at Home] : Feels safe at home [Fully functional (bathing, dressing, toileting, transferring, walking, feeding)] : Fully functional (bathing, dressing, toileting, transferring, walking, feeding) [Fully functional (using the telephone, shopping, preparing meals, housekeeping, doing laundry, using] : Fully functional and needs no help or supervision to perform IADLs (using the telephone, shopping, preparing meals, housekeeping, doing laundry, using transportation, managing medications and managing finances) [Reports normal functional visual acuity (ie: able to read med bottle)] : Reports normal functional visual acuity [Seat Belt] :  uses seat belt [de-identified] : walking [de-identified] : low fat [EyeExamDate] : regularly Dr Polk [Change in mental status noted] : No change in mental status noted [Language] : denies difficulty with language [Behavior] : denies difficulty with behavior [Reports changes in hearing] : Reports no changes in hearing [Reports changes in vision] : Reports no changes in vision [Reports changes in dental health] : Reports no changes in dental health [MammogramDate] : 2020 [ColonoscopyDate] : 2019 [ColonoscopyComments] : benign

## 2023-06-27 NOTE — HISTORY OF PRESENT ILLNESS
[FreeTextEntry1] : CPE [de-identified] : pt has been doing well without any acute illness or injury. \par pt had cold symptoms started 1 month ago, which is better now, but still has some phlegm in throat without any other symptoms. No fever, cough, SOB or sore throat. \par

## 2023-06-27 NOTE — PLAN
[FreeTextEntry1] : \par HCM )  follow w/ dental, opthalmology, GYN as recommended\par MMG Rx'd with breast US ( history of breast nodules B/L )\par Diet low fat/cholesterol diet\par vaccination up to date\par Lab ( venipuncture ) done today at this office\par EKG done for HCM hyperlipidemia at this office today;  NSR no acute ST-T changes\par \par persistent phlegm without other URI symptoms, trial of mucinex syrup 10 Ml every 6 hours as needed and observe\par  observe closely clinically and RTC if persistent or worsening S/S\par \par Hyperlipidemia cont' low fat/chol diet, repeat fasting lab\par \par RTC in 1 year CPE\par

## 2023-06-29 LAB
25(OH)D3 SERPL-MCNC: 39.4 NG/ML
ALBUMIN SERPL ELPH-MCNC: 4.6 G/DL
ALP BLD-CCNC: 63 U/L
ALT SERPL-CCNC: 26 U/L
ANION GAP SERPL CALC-SCNC: 13 MMOL/L
AST SERPL-CCNC: 30 U/L
BILIRUB SERPL-MCNC: 0.8 MG/DL
BUN SERPL-MCNC: 17 MG/DL
CALCIUM SERPL-MCNC: 9.7 MG/DL
CHLORIDE SERPL-SCNC: 105 MMOL/L
CHOLEST SERPL-MCNC: 219 MG/DL
CO2 SERPL-SCNC: 23 MMOL/L
CREAT SERPL-MCNC: 0.77 MG/DL
EGFR: 92 ML/MIN/1.73M2
GLUCOSE SERPL-MCNC: 85 MG/DL
HCT VFR BLD CALC: 38.7 %
HDLC SERPL-MCNC: 84 MG/DL
HGB BLD-MCNC: 12.3 G/DL
LDLC SERPL CALC-MCNC: 118 MG/DL
MCHC RBC-ENTMCNC: 30.8 PG
MCHC RBC-ENTMCNC: 31.8 GM/DL
MCV RBC AUTO: 96.8 FL
NONHDLC SERPL-MCNC: 135 MG/DL
PLATELET # BLD AUTO: 152 K/UL
POTASSIUM SERPL-SCNC: 4.1 MMOL/L
PROT SERPL-MCNC: 7.3 G/DL
RBC # BLD: 4 M/UL
RBC # FLD: 12.8 %
SODIUM SERPL-SCNC: 141 MMOL/L
TRIGL SERPL-MCNC: 84 MG/DL
TSH SERPL-ACNC: 2.48 UIU/ML
WBC # FLD AUTO: 4.67 K/UL

## 2024-01-11 ENCOUNTER — APPOINTMENT (OUTPATIENT)
Dept: INTERNAL MEDICINE | Facility: CLINIC | Age: 54
End: 2024-01-11
Payer: COMMERCIAL

## 2024-01-11 VITALS
BODY MASS INDEX: 21.16 KG/M2 | DIASTOLIC BLOOD PRESSURE: 62 MMHG | HEART RATE: 90 BPM | WEIGHT: 115 LBS | SYSTOLIC BLOOD PRESSURE: 104 MMHG | HEIGHT: 62 IN | OXYGEN SATURATION: 99 %

## 2024-01-11 DIAGNOSIS — R20.0 ANESTHESIA OF SKIN: ICD-10-CM

## 2024-01-11 DIAGNOSIS — R20.2 ANESTHESIA OF SKIN: ICD-10-CM

## 2024-01-11 PROCEDURE — G2211 COMPLEX E/M VISIT ADD ON: CPT

## 2024-01-11 PROCEDURE — 99214 OFFICE O/P EST MOD 30 MIN: CPT

## 2024-01-11 RX ORDER — GABAPENTIN 100 MG/1
100 CAPSULE ORAL
Qty: 30 | Refills: 2 | Status: ACTIVE | COMMUNITY
Start: 2024-01-11 | End: 1900-01-01

## 2024-01-11 NOTE — PLAN
[FreeTextEntry1] :  unclear etiology of sensory changes on arms and legs during only at night possibly due to extreme exercises during daytime Reduce the amount of exercise, gabapentin 100-200mg at bedtime as needed and observe Neurology evaluation with EMG if persistent or worsening  observe closely clinically and RTC if persistent or worsening S/S

## 2024-01-11 NOTE — HISTORY OF PRESENT ILLNESS
[FreeTextEntry8] : pt started having tingling sensation and sharp pain of Rt arm and hand and Rt foot and lower leg under knee for several months only at night time when sleeping, so wakes up frequently. Pt noticed that similar symptoms for 2 days this week on left hand/arm during sleep. Pt has no symptoms during daytime, able to walk well, and no injury. No muscle weakness. No HA. No urinary or bowel symptoms. No vision /speech/swallowing change. pt does a lot of walking and muscle strengthening exericses during daytime.

## 2024-01-11 NOTE — PHYSICAL EXAM
[No Acute Distress] : no acute distress [Well Nourished] : well nourished [Well Developed] : well developed [Well-Appearing] : well-appearing [Normal Sclera/Conjunctiva] : normal sclera/conjunctiva [PERRL] : pupils equal round and reactive to light [EOMI] : extraocular movements intact [No Lymphadenopathy] : no lymphadenopathy [Supple] : supple [No Respiratory Distress] : no respiratory distress  [No Accessory Muscle Use] : no accessory muscle use [Clear to Auscultation] : lungs were clear to auscultation bilaterally [Normal Rate] : normal rate  [Regular Rhythm] : with a regular rhythm [No Edema] : there was no peripheral edema [No Extremity Clubbing/Cyanosis] : no extremity clubbing/cyanosis [Soft] : abdomen soft [Non Tender] : non-tender [Non-distended] : non-distended [No Joint Swelling] : no joint swelling [Grossly Normal Strength/Tone] : grossly normal strength/tone [Coordination Grossly Intact] : coordination grossly intact [No Focal Deficits] : no focal deficits [Normal Gait] : normal gait [Normal Affect] : the affect was normal [Alert and Oriented x3] : oriented to person, place, and time [Normal Insight/Judgement] : insight and judgment were intact [de-identified] : C2-12 grossly intack. muscle tone and strength of 4 exts normal. Shoulder /elbow/hand joints FROM no swelling. Neck FROM

## 2024-01-11 NOTE — REVIEW OF SYSTEMS
[Negative] : Integumentary [Headache] : no headache [Dizziness] : no dizziness [Fainting] : no fainting [Confusion] : no confusion [Memory Loss] : no memory loss [Unsteady Walking] : no ataxia [de-identified] : tingling and pinching like pain at night on right arm, hand and leg, now on left hand and arm

## 2024-02-05 NOTE — ASU DISCHARGE PLAN (ADULT/PEDIATRIC) - MODE OF TRANSPORTATION
Problem: Respiratory - Adult  Goal: Achieves optimal ventilation and oxygenation  2/5/2024 0621 by Vik Schilling RN  Outcome: Progressing  2/4/2024 1814 by Maria Guadalupe Melgoza RN  Outcome: Progressing  2/4/2024 1813 by Maria Guadalupe Melgoza RN  Outcome: Progressing  Flowsheets (Taken 2/4/2024 0830 by Nora Mccloud RCP)  Achieves optimal ventilation and oxygenation:   Assess for changes in respiratory status   Assess for changes in mentation and behavior   Position to facilitate oxygenation and minimize respiratory effort   Oxygen supplementation based on oxygen saturation or arterial blood gases   Assess the need for suctioning and aspirate as needed   Respiratory therapy support as indicated     Problem: Discharge Planning  Goal: Discharge to home or other facility with appropriate resources  2/5/2024 0621 by Vik Schilling RN  Outcome: Progressing  2/4/2024 1814 by Maria Guadalupe Melgoza RN  Outcome: Progressing  2/4/2024 1813 by Maria Guadalupe Melgoza RN  Outcome: Progressing     Problem: Pain  Goal: Verbalizes/displays adequate comfort level or baseline comfort level  2/5/2024 0621 by Vik Schilling RN  Outcome: Progressing  2/4/2024 1814 by Maria Guadalupe Melgoza RN  Outcome: Progressing  2/4/2024 1813 by Maria Guadalupe Melgoza RN  Outcome: Progressing     Problem: Safety - Medical Restraint  Goal: Remains free of injury from restraints (Restraint for Interference with Medical Device)  Description: INTERVENTIONS:  1. Determine that other, less restrictive measures have been tried or would not be effective before applying the restraint  2. Evaluate the patient's condition at the time of restraint application  3. Inform patient/family regarding the reason for restraint  4. Q2H: Monitor safety, psychosocial status, comfort, nutrition and hydration  2/5/2024 0621 by Vik Schilling RN  Outcome: Progressing  Flowsheets  Taken 2/5/2024 0400  Remains free of injury from restraints (restraint for interference with medical device): Every  Ambulatory

## 2024-06-29 DIAGNOSIS — Z11.59 ENCOUNTER FOR SCREENING FOR OTHER VIRAL DISEASES: ICD-10-CM

## 2024-07-02 ENCOUNTER — APPOINTMENT (OUTPATIENT)
Dept: INTERNAL MEDICINE | Facility: CLINIC | Age: 54
End: 2024-07-02

## 2024-07-09 ENCOUNTER — APPOINTMENT (OUTPATIENT)
Dept: INTERNAL MEDICINE | Facility: CLINIC | Age: 54
End: 2024-07-09

## 2024-07-24 NOTE — BRIEF OPERATIVE NOTE - NSICDXBRIEFPROCEDURE_GEN_ALL_CORE_FT
PROCEDURES:  Excision, mass, scapula 10-Nov-2022 08:33:45 Left posterior shoulder mass excision. Zachery Justin  
(V5) oriented

## 2025-01-06 ENCOUNTER — APPOINTMENT (OUTPATIENT)
Dept: PULMONOLOGY | Facility: CLINIC | Age: 55
End: 2025-01-06

## (undated) DEVICE — TUBING SUCTION NONCONDUCTIVE 6MM X 12FT

## (undated) DEVICE — VENODYNE/SCD SLEEVE CALF MEDIUM

## (undated) DEVICE — POSITIONER STRAP ARMBOARD VELCRO TS-30

## (undated) DEVICE — PACK MINOR NO DRAPE

## (undated) DEVICE — SUT VICRYL 3-0 27" SH UNDYED

## (undated) DEVICE — GLV 7 PROTEXIS (CREAM) NEU-THERA

## (undated) DEVICE — CANISTER DISPOSABLE THIN WALL 3000CC

## (undated) DEVICE — ELCTR GROUNDING PAD ADULT COVIDIEN

## (undated) DEVICE — DRAPE LAPAROTOMY TRANSVERSE

## (undated) DEVICE — GLV 8 PROTEXIS (WHITE)

## (undated) DEVICE — SUT MONOCRYL 4-0 27" PS-2 UNDYED

## (undated) DEVICE — SOL IRR POUR NS 0.9% 500ML

## (undated) DEVICE — DRSG STERISTRIPS 0.25 X 3"

## (undated) DEVICE — WARMING BLANKET FULL ADULT